# Patient Record
Sex: FEMALE | Race: OTHER | HISPANIC OR LATINO | URBAN - METROPOLITAN AREA
[De-identification: names, ages, dates, MRNs, and addresses within clinical notes are randomized per-mention and may not be internally consistent; named-entity substitution may affect disease eponyms.]

---

## 2018-07-03 ENCOUNTER — EMERGENCY (EMERGENCY)
Facility: HOSPITAL | Age: 58
LOS: 1 days | Discharge: HOME | End: 2018-07-03
Attending: EMERGENCY MEDICINE
Payer: SELF-PAY

## 2018-07-03 VITALS
SYSTOLIC BLOOD PRESSURE: 168 MMHG | RESPIRATION RATE: 18 BRPM | HEART RATE: 84 BPM | WEIGHT: 160.06 LBS | TEMPERATURE: 98 F | HEIGHT: 61.02 IN | DIASTOLIC BLOOD PRESSURE: 79 MMHG | OXYGEN SATURATION: 99 %

## 2018-07-03 PROCEDURE — 99283 EMERGENCY DEPT VISIT LOW MDM: CPT

## 2018-07-03 NOTE — ED PROVIDER NOTE - OBJECTIVE STATEMENT
57 year old female no significant past medical hx presenting after having a little anxiety before flying back to [place]. states that [employer] took her blood pressure while she was waiting for flight and had a systolic blood pressure of 150 or 160 and refused to place her on the plane. pt denies haviing nausea, vomiting, ha, blurry vision, abdominal pains. denies recent illness. fever or chills. no cardiac hx, does not take any medication regularly, no immediate family with cardiac hx. has a primary doctor back in [place] that she can follow up with. non smoker, social drinking, no drug use

## 2018-07-03 NOTE — ED PROVIDER NOTE - ATTENDING CONTRIBUTION TO CARE
seen with acp  bp found to be camilo with mild headache trying to fly back to [place] requires clearance  PE unremarkable  will start on clonidine  Agree with acps assessment hx and physical

## 2018-07-03 NOTE — ED PROVIDER NOTE - MEDICAL DECISION MAKING DETAILS
anxiety over flying  blood pressure stable, asymptomatic  clonidin.1 for flight    and note clearing for flight

## 2018-07-03 NOTE — ED ADULT TRIAGE NOTE - CHIEF COMPLAINT QUOTE
'My blood pressure is  high , I took my medicine but it was 150/96 and I need to be cleared to fly,"  Pt denies headache.

## 2018-07-03 NOTE — ED PROVIDER NOTE - MUSCULOSKELETAL, MLM
Spine appears normal, range of motion is not limited, no muscle or joint tenderness 5/5 muscle strength

## 2018-09-05 NOTE — ED PROVIDER NOTE - CARDIAC, MLM
Normal rate, regular rhythm.  Heart sounds S1, S2.  No murmurs, rubs or gallops. no chest wall tenderness
Yes
Asthma

## 2021-01-17 ENCOUNTER — EMERGENCY (EMERGENCY)
Facility: HOSPITAL | Age: 61
LOS: 1 days | Discharge: HOME | End: 2021-01-17
Attending: EMERGENCY MEDICINE
Payer: SELF-PAY

## 2021-01-17 VITALS
OXYGEN SATURATION: 98 % | DIASTOLIC BLOOD PRESSURE: 72 MMHG | SYSTOLIC BLOOD PRESSURE: 135 MMHG | RESPIRATION RATE: 16 BRPM | HEART RATE: 98 BPM

## 2021-01-17 VITALS
HEIGHT: 61.02 IN | SYSTOLIC BLOOD PRESSURE: 161 MMHG | OXYGEN SATURATION: 99 % | DIASTOLIC BLOOD PRESSURE: 81 MMHG | TEMPERATURE: 99 F | WEIGHT: 164.91 LBS | RESPIRATION RATE: 18 BRPM | HEART RATE: 105 BPM

## 2021-01-17 LAB
ALBUMIN SERPL ELPH-MCNC: 4 G/DL — SIGNIFICANT CHANGE UP (ref 3.3–5.2)
ALP SERPL-CCNC: 60 U/L — SIGNIFICANT CHANGE UP (ref 40–120)
ALT FLD-CCNC: 74 U/L — HIGH
ANION GAP SERPL CALC-SCNC: 13 MMOL/L — SIGNIFICANT CHANGE UP (ref 5–17)
APPEARANCE UR: CLEAR — SIGNIFICANT CHANGE UP
APTT BLD: 29.2 SEC — SIGNIFICANT CHANGE UP (ref 27.5–35.5)
AST SERPL-CCNC: 57 U/L — HIGH
BACTERIA # UR AUTO: ABNORMAL
BASOPHILS # BLD AUTO: 0.04 K/UL — SIGNIFICANT CHANGE UP (ref 0–0.2)
BASOPHILS NFR BLD AUTO: 0.6 % — SIGNIFICANT CHANGE UP (ref 0–2)
BILIRUB SERPL-MCNC: <0.2 MG/DL — LOW (ref 0.4–2)
BILIRUB UR-MCNC: NEGATIVE — SIGNIFICANT CHANGE UP
BUN SERPL-MCNC: 19 MG/DL — SIGNIFICANT CHANGE UP (ref 8–20)
CALCIUM SERPL-MCNC: 9 MG/DL — SIGNIFICANT CHANGE UP (ref 8.6–10.2)
CHLORIDE SERPL-SCNC: 103 MMOL/L — SIGNIFICANT CHANGE UP (ref 98–107)
CK SERPL-CCNC: 145 U/L — SIGNIFICANT CHANGE UP (ref 25–170)
CO2 SERPL-SCNC: 24 MMOL/L — SIGNIFICANT CHANGE UP (ref 22–29)
COLOR SPEC: YELLOW — SIGNIFICANT CHANGE UP
CREAT SERPL-MCNC: 0.74 MG/DL — SIGNIFICANT CHANGE UP (ref 0.5–1.3)
DIFF PNL FLD: ABNORMAL
EOSINOPHIL # BLD AUTO: 0.31 K/UL — SIGNIFICANT CHANGE UP (ref 0–0.5)
EOSINOPHIL NFR BLD AUTO: 4.4 % — SIGNIFICANT CHANGE UP (ref 0–6)
EPI CELLS # UR: SIGNIFICANT CHANGE UP
GLUCOSE SERPL-MCNC: 135 MG/DL — HIGH (ref 70–99)
GLUCOSE UR QL: NEGATIVE MG/DL — SIGNIFICANT CHANGE UP
HCT VFR BLD CALC: 38.6 % — SIGNIFICANT CHANGE UP (ref 34.5–45)
HGB BLD-MCNC: 12.3 G/DL — SIGNIFICANT CHANGE UP (ref 11.5–15.5)
IMM GRANULOCYTES NFR BLD AUTO: 0.3 % — SIGNIFICANT CHANGE UP (ref 0–1.5)
INR BLD: 0.97 RATIO — SIGNIFICANT CHANGE UP (ref 0.88–1.16)
KETONES UR-MCNC: NEGATIVE — SIGNIFICANT CHANGE UP
LEUKOCYTE ESTERASE UR-ACNC: ABNORMAL
LYMPHOCYTES # BLD AUTO: 2.26 K/UL — SIGNIFICANT CHANGE UP (ref 1–3.3)
LYMPHOCYTES # BLD AUTO: 32.4 % — SIGNIFICANT CHANGE UP (ref 13–44)
MCHC RBC-ENTMCNC: 27.2 PG — SIGNIFICANT CHANGE UP (ref 27–34)
MCHC RBC-ENTMCNC: 31.9 GM/DL — LOW (ref 32–36)
MCV RBC AUTO: 85.4 FL — SIGNIFICANT CHANGE UP (ref 80–100)
MONOCYTES # BLD AUTO: 0.54 K/UL — SIGNIFICANT CHANGE UP (ref 0–0.9)
MONOCYTES NFR BLD AUTO: 7.7 % — SIGNIFICANT CHANGE UP (ref 2–14)
NEUTROPHILS # BLD AUTO: 3.81 K/UL — SIGNIFICANT CHANGE UP (ref 1.8–7.4)
NEUTROPHILS NFR BLD AUTO: 54.6 % — SIGNIFICANT CHANGE UP (ref 43–77)
NITRITE UR-MCNC: NEGATIVE — SIGNIFICANT CHANGE UP
NT-PROBNP SERPL-SCNC: 95 PG/ML — SIGNIFICANT CHANGE UP (ref 0–300)
PH UR: 6 — SIGNIFICANT CHANGE UP (ref 5–8)
PLATELET # BLD AUTO: 233 K/UL — SIGNIFICANT CHANGE UP (ref 150–400)
POTASSIUM SERPL-MCNC: 4.4 MMOL/L — SIGNIFICANT CHANGE UP (ref 3.5–5.3)
POTASSIUM SERPL-SCNC: 4.4 MMOL/L — SIGNIFICANT CHANGE UP (ref 3.5–5.3)
PROT SERPL-MCNC: 7.1 G/DL — SIGNIFICANT CHANGE UP (ref 6.6–8.7)
PROT UR-MCNC: 15 MG/DL
PROTHROM AB SERPL-ACNC: 11.3 SEC — SIGNIFICANT CHANGE UP (ref 10.6–13.6)
RBC # BLD: 4.52 M/UL — SIGNIFICANT CHANGE UP (ref 3.8–5.2)
RBC # FLD: 12.8 % — SIGNIFICANT CHANGE UP (ref 10.3–14.5)
RBC CASTS # UR COMP ASSIST: SIGNIFICANT CHANGE UP /HPF (ref 0–4)
SODIUM SERPL-SCNC: 140 MMOL/L — SIGNIFICANT CHANGE UP (ref 135–145)
SP GR SPEC: 1.01 — SIGNIFICANT CHANGE UP (ref 1.01–1.02)
TROPONIN T SERPL-MCNC: <0.01 NG/ML — SIGNIFICANT CHANGE UP (ref 0–0.06)
UROBILINOGEN FLD QL: NEGATIVE MG/DL — SIGNIFICANT CHANGE UP
WBC # BLD: 6.98 K/UL — SIGNIFICANT CHANGE UP (ref 3.8–10.5)
WBC # FLD AUTO: 6.98 K/UL — SIGNIFICANT CHANGE UP (ref 3.8–10.5)
WBC UR QL: SIGNIFICANT CHANGE UP

## 2021-01-17 PROCEDURE — 85730 THROMBOPLASTIN TIME PARTIAL: CPT

## 2021-01-17 PROCEDURE — 93005 ELECTROCARDIOGRAM TRACING: CPT

## 2021-01-17 PROCEDURE — 71045 X-RAY EXAM CHEST 1 VIEW: CPT

## 2021-01-17 PROCEDURE — 99285 EMERGENCY DEPT VISIT HI MDM: CPT

## 2021-01-17 PROCEDURE — 36415 COLL VENOUS BLD VENIPUNCTURE: CPT

## 2021-01-17 PROCEDURE — 84484 ASSAY OF TROPONIN QUANT: CPT

## 2021-01-17 PROCEDURE — 83880 ASSAY OF NATRIURETIC PEPTIDE: CPT

## 2021-01-17 PROCEDURE — 71045 X-RAY EXAM CHEST 1 VIEW: CPT | Mod: 26

## 2021-01-17 PROCEDURE — 93970 EXTREMITY STUDY: CPT

## 2021-01-17 PROCEDURE — 93010 ELECTROCARDIOGRAM REPORT: CPT

## 2021-01-17 PROCEDURE — 82550 ASSAY OF CK (CPK): CPT

## 2021-01-17 PROCEDURE — 96374 THER/PROPH/DIAG INJ IV PUSH: CPT

## 2021-01-17 PROCEDURE — 99284 EMERGENCY DEPT VISIT MOD MDM: CPT | Mod: 25

## 2021-01-17 PROCEDURE — 80053 COMPREHEN METABOLIC PANEL: CPT

## 2021-01-17 PROCEDURE — 85025 COMPLETE CBC W/AUTO DIFF WBC: CPT

## 2021-01-17 PROCEDURE — 93970 EXTREMITY STUDY: CPT | Mod: 26

## 2021-01-17 PROCEDURE — 85610 PROTHROMBIN TIME: CPT

## 2021-01-17 PROCEDURE — 81001 URINALYSIS AUTO W/SCOPE: CPT

## 2021-01-17 RX ORDER — POTASSIUM CHLORIDE 20 MEQ
1 PACKET (EA) ORAL
Qty: 4 | Refills: 0
Start: 2021-01-17 | End: 2021-01-20

## 2021-01-17 RX ORDER — FUROSEMIDE 40 MG
1 TABLET ORAL
Qty: 4 | Refills: 0
Start: 2021-01-17 | End: 2021-01-20

## 2021-01-17 RX ORDER — FUROSEMIDE 40 MG
40 TABLET ORAL ONCE
Refills: 0 | Status: COMPLETED | OUTPATIENT
Start: 2021-01-17 | End: 2021-01-17

## 2021-01-17 RX ADMIN — Medication 40 MILLIGRAM(S): at 21:59

## 2021-01-17 NOTE — ED PROVIDER NOTE - CLINICAL SUMMARY MEDICAL DECISION MAKING FREE TEXT BOX
PT with stable VS, no acute distress, non toxic appearing, tolerating PO in the ED, Pt with no SOB, no diff breathing, no acute congestion on xray, no elevation on BNP, Pt PT with stable VS, no acute distress, non toxic appearing, tolerating PO in the ED, Pt with no SOB, no diff breathing, no acute congestion on xray, no elevation on BNP, Case management called to set up 2 day follow up to cards pt placed on lasix for 4 days to take off fluids, PT educated about when to return to the ED if needed. PT verbalizes that he understands all instructions and results. Pt informed that ED is open and available 24/7 365 days a yr, encouraged to return to the ED if they have any change in condition, or feel the need for revaluation.   utilized to obtain History, ROS, Physical Exam, explanations of results and plan of care, as well as follow up instructions.

## 2021-01-17 NOTE — ED PROVIDER NOTE - OBJECTIVE STATEMENT
PT with SPMHX of  HTN, HLD presents to the ED with complaint of BL leg swelling x3 wk. Pt states that she had a gradual onset of swelling that has gotten progressively worse travailing to her knee. Pt states that she has never had swelling like this before dines change in diet or pain. Pt states that she has missed her HTN medication for the last 2 wk (Losartan). Pt dines fever, chills, weakness, SOB, diff breathing, cough, CP, back pain, HA, dizziness.

## 2021-01-17 NOTE — ED PROVIDER NOTE - ATTENDING CONTRIBUTION TO CARE
61 yo F presents to ED c/o bilateral LE edema x last 3 weeks.  Pt with hx of HTN, HLD abd admits to being non-compliant with her Losartan x last 2 weeks.  Pt denies any assoc CP, SOB, abd pain, N/V, headache or dizziness.  On exam pt awake and alert ion NAD, mm moist, Neck supple, no JVD, Cor Reg. Lungs clear b/l, Abd soft, NT ,Ext + 2  pitting LE edema, euro non-focal.  Will check labs, EKG, CXR and re-eval

## 2021-01-17 NOTE — ED PROVIDER NOTE - PATIENT PORTAL LINK FT
You can access the FollowMyHealth Patient Portal offered by [facility] by registering at the following website: [email or web address]. By joining [employer]’s FollowMyHealth portal, you will also be able to view your health information using other applications (apps) compatible with our system.

## 2021-01-17 NOTE — ED PROVIDER NOTE - NSFOLLOWUPCLINICS_GEN_ALL_ED_FT
[facility]  Cardiology  [address] [place] [address]  Phone: [phone]  Fax:   Follow Up Time:

## 2021-01-17 NOTE — ED PROVIDER NOTE - NS ED ROS FT
ROS: CONTUSIONAL: Denies fever, chills, fatigue, wt loss. HEAD: Denies trauma, HA, Dizziness. EYE: Denies Acute visual changes, diplopia. ENMT: Denies change in hearing, tinnitus, epistaxis, difficulty swallowing, sore throat. CARDIO: admits to BL leg swelling,  Denies CP, palpitations, edema. RESP: Denies Cough, SOB , Diff breathing, hemoptysis. GI: Denies N/V, ABD pain, change in bowel movement. URINARY: Denies difficulty urinating, pelvic pain. MS:  Denies joint pain, back pain, weakness, decreased ROM, swelling. NEURO: Denies change in gait, seizures, loss of sensation, dizziness, confusion LOC.  PSY: NO SI/HI.

## 2021-01-17 NOTE — ED ADULT TRIAGE NOTE - CHIEF COMPLAINT QUOTE
patient alert and oriented x 4 MAEx 4 brought in by daughter states that she has lower extremity swelling, facial swelling under eyes HA and hot face for 3 days

## 2021-01-17 NOTE — ED PROVIDER NOTE - NSFOLLOWUPINSTRUCTIONS_ED_ALL_ED_FT
Educación para el paciente: Inflamación (Conceptos Básicos)  View in English  Redactado por los médicos y editores de [employer]  ¿Qué es la inflamación?  Maritza inflamación ocurre cuando se acumula líquido en espacios pequeños alrededor de los tejidos y órganos en el interior del cuerpo. [place] también se conoce con el nombre de “edema”. Algunas partes del cuerpo que comúnmente se pueden inflamar son:    ?La parte inferior de las piernas o las randi    ?El área del estómago    ?El pecho, donde la inflamación se puede producir en los pulmones o en el espacio que rodea los pulmones    La inflamación de las piernas, las randi y el área del estómago puede ser molesta y puede ser un síntoma de un padecimiento más grave. La inflamación de los pulmones puede poner en riesgo la damien, porque generalmente es un síntoma de un problema cardíaco grave.    ¿Cuáles son los síntomas de la inflamación?  Los síntomas de la inflamación pueden incluir:    ?Hinchazón de la piel, que puede hacer que la piel luzca estirada y brillante. [place] a menudo ocurre con la inflamación de la parte inferior de las piernas y la espalda, y puede empeorar si la persona permanece de pie o sentada mucho tiempo (figura 1).    ?Aumento de tamaño del área del estómago (con inflamación de esta área)    ?Dificultades para respirar (con inflamación del pecho)    ¿Cuáles son las causas de la inflamación?  Existen distintos padecimientos que pueden causar inflamación, entre ellos:    ?Problemas de las venas (los vasos sanguíneos) de las piernas – Normalmente, las venas llevan la ankit desde el cuerpo hasta el corazón, genet si las válvulas de las venas no funcionan javier, las venas no pueden llevar suficiente ankit al corazón y esto puede causar inflamación en la parte inferior de las piernas.    ?Coágulos de ankit – Las personas que tienen un coágulo de ankit que bloquea maritza vena de la pierna pueden sufrir la inflamación de los pies o los tobillos.    ?Embarazo – A las embarazadas se les pueden inflamar las randi, los pies o la kathleen.    ?Períodos mensuales – Antes de tener el período, las mujeres pueden sufrir la inflamación de distintas partes del cuerpo.    ?Medicinas – La inflamación puede ser un efecto secundario de algunas medicinas, lowell medicinas para la diabetes, la presión arterial keyshawn o el dolor.    ?Problemas renales – Las personas con ciertos problemas renales pueden sufrir inflamación de la parte inferior de las piernas o alrededor de los ojos.    ?Insuficiencia cardíaca – La insuficiencia cardíaca es un tipo de problema cardíaco por el cual el corazón no bombea ankit normalmente. Las personas con insuficiencia cardíaca pueden tener inflamación en las piernas, el área del estómago o los pulmones.    ?Problemas hepáticos – Las personas con ciertos problemas en el hígado pueden sufrir inflamación en el área del estómago o la parte inferior de las piernas.    ?Viajes – Las personas que permanecen sentadas mucho tiempo mientras viajan pueden sufrir inflamación en la parte inferior de las piernas.    ¿Cuándo yolie llamar al médico o enfermero?  Llame a persaud médico o enfermero si tiene maritza nueva inflamación:    ?En maritza o ambas piernas    ?En las randi    ?En el área del estómago    ?Alrededor de los ojos    También debe llamar a persaud médico o enfermero si viaja y permanece sentado mucho tiempo y luego tiene dolor o inflamación de piernas que no desaparece después de unos cuantos días.    ¿Cómo se trata la inflamación?  Los médicos pueden tratar la inflamación de distintas maneras, dependiendo de la causa. El tratamiento puede incluir maritza o más de las siguientes opciones:    ?Tratamiento para el padecimiento médico que causa la inflamación    ?Cambios en la dieta para reducir la cantidad de tiago en la comida    ?Medicinas para ayudar al cuerpo a deshacerse del exceso de líquido    ?Medias especiales llamadas “medias compresivas”, las cuales se ajustan sobre el tobillo y la pierna y pueden reducir la inflamación de las piernas. Si el médico o enfermero le recomienda que las use, le dirá qué tipo debe usar y cómo se las debe colocar (figura 2 y figura 3 y tabla 1).    ?Levantar las piernas – Algunas personas pueden reducir la inflamación en las piernas, los tobillos y los pies levantando las piernas 3 o 4 veces al día john 30 minutos cada vez. Es necesario levantar las piernas por encima del nivel del corazón.    No todos los tipos de inflamación necesitan tratamiento. Por ejemplo, la inflamación que ocurre john el embarazo o antes de los períodos menstruales generalmente no necesita tratamiento.    ¿Cómo puedo prevenir la inflamación de las piernas en viajes largos de avión?  Para ayudar a prevenir la inflamación de las piernas john un vuelo de más de 6 a 8 horas, puede:    ?Pararse y caminar cada hora o cada dos horas    ?No fumar antes de viajar    ?Usar ropa cómoda y suelta    ?Preguntar si se puede sentar en el mamparo o la austen de la salida de emergencia    ?Estirar y flexionar los pies, y doblar las rodillas periódicamente    ?Gregory mucho líquido y evitar el alcohol    ?No gregory medicinas lowell píldoras para dormir que pueden impedir que se levante y se mueva

## 2021-01-17 NOTE — ED PROVIDER NOTE - ADDITIONAL NOTES AND INSTRUCTIONS:
PT was evaluated At [facility] ED and was found to have a condition that warranted time of to rest and heal from WORK/SCHOOL.   Elie Roe PA-C